# Patient Record
Sex: FEMALE | Race: WHITE | NOT HISPANIC OR LATINO | URBAN - METROPOLITAN AREA
[De-identification: names, ages, dates, MRNs, and addresses within clinical notes are randomized per-mention and may not be internally consistent; named-entity substitution may affect disease eponyms.]

---

## 2023-09-11 ENCOUNTER — EMERGENCY (EMERGENCY)
Facility: HOSPITAL | Age: 58
LOS: 1 days | Discharge: HOME | End: 2023-09-11
Attending: STUDENT IN AN ORGANIZED HEALTH CARE EDUCATION/TRAINING PROGRAM | Admitting: STUDENT IN AN ORGANIZED HEALTH CARE EDUCATION/TRAINING PROGRAM
Payer: COMMERCIAL

## 2023-09-11 VITALS
HEIGHT: 65 IN | HEART RATE: 65 BPM | WEIGHT: 149.91 LBS | DIASTOLIC BLOOD PRESSURE: 76 MMHG | RESPIRATION RATE: 16 BRPM | SYSTOLIC BLOOD PRESSURE: 114 MMHG | TEMPERATURE: 98 F | OXYGEN SATURATION: 97 %

## 2023-09-11 VITALS
DIASTOLIC BLOOD PRESSURE: 79 MMHG | OXYGEN SATURATION: 97 % | HEART RATE: 62 BPM | TEMPERATURE: 98 F | SYSTOLIC BLOOD PRESSURE: 121 MMHG | RESPIRATION RATE: 18 BRPM

## 2023-09-11 DIAGNOSIS — I44.4 LEFT ANTERIOR FASCICULAR BLOCK: ICD-10-CM

## 2023-09-11 DIAGNOSIS — R07.81 PLEURODYNIA: ICD-10-CM

## 2023-09-11 DIAGNOSIS — R20.2 PARESTHESIA OF SKIN: ICD-10-CM

## 2023-09-11 DIAGNOSIS — R06.02 SHORTNESS OF BREATH: ICD-10-CM

## 2023-09-11 DIAGNOSIS — M79.602 PAIN IN LEFT ARM: ICD-10-CM

## 2023-09-11 LAB
ALBUMIN SERPL ELPH-MCNC: 4.7 G/DL — SIGNIFICANT CHANGE UP (ref 3.3–5)
ALP SERPL-CCNC: 48 U/L — SIGNIFICANT CHANGE UP (ref 40–120)
ALT FLD-CCNC: 18 U/L — SIGNIFICANT CHANGE UP (ref 10–45)
ANION GAP SERPL CALC-SCNC: 13 MMOL/L — SIGNIFICANT CHANGE UP (ref 5–17)
APTT BLD: 33.6 SEC — SIGNIFICANT CHANGE UP (ref 24.5–35.6)
AST SERPL-CCNC: 17 U/L — SIGNIFICANT CHANGE UP (ref 10–40)
BILIRUB SERPL-MCNC: 0.8 MG/DL — SIGNIFICANT CHANGE UP (ref 0.2–1.2)
BUN SERPL-MCNC: 17 MG/DL — SIGNIFICANT CHANGE UP (ref 7–23)
CALCIUM SERPL-MCNC: 10.4 MG/DL — SIGNIFICANT CHANGE UP (ref 8.4–10.5)
CHLORIDE SERPL-SCNC: 104 MMOL/L — SIGNIFICANT CHANGE UP (ref 96–108)
CO2 SERPL-SCNC: 24 MMOL/L — SIGNIFICANT CHANGE UP (ref 22–31)
CREAT SERPL-MCNC: 0.63 MG/DL — SIGNIFICANT CHANGE UP (ref 0.5–1.3)
EGFR: 104 ML/MIN/1.73M2 — SIGNIFICANT CHANGE UP
GLUCOSE SERPL-MCNC: 109 MG/DL — HIGH (ref 70–99)
HCT VFR BLD CALC: 44.9 % — SIGNIFICANT CHANGE UP (ref 34.5–45)
HGB BLD-MCNC: 15.3 G/DL — SIGNIFICANT CHANGE UP (ref 11.5–15.5)
INR BLD: 0.99 — SIGNIFICANT CHANGE UP (ref 0.85–1.18)
MAGNESIUM SERPL-MCNC: 2.2 MG/DL — SIGNIFICANT CHANGE UP (ref 1.6–2.6)
MCHC RBC-ENTMCNC: 28.2 PG — SIGNIFICANT CHANGE UP (ref 27–34)
MCHC RBC-ENTMCNC: 34.1 GM/DL — SIGNIFICANT CHANGE UP (ref 32–36)
MCV RBC AUTO: 82.7 FL — SIGNIFICANT CHANGE UP (ref 80–100)
NRBC # BLD: 0 /100 WBCS — SIGNIFICANT CHANGE UP (ref 0–0)
PHOSPHATE SERPL-MCNC: 3.8 MG/DL — SIGNIFICANT CHANGE UP (ref 2.5–4.5)
PLATELET # BLD AUTO: 243 K/UL — SIGNIFICANT CHANGE UP (ref 150–400)
POTASSIUM SERPL-MCNC: 4.1 MMOL/L — SIGNIFICANT CHANGE UP (ref 3.5–5.3)
POTASSIUM SERPL-SCNC: 4.1 MMOL/L — SIGNIFICANT CHANGE UP (ref 3.5–5.3)
PROT SERPL-MCNC: 7.7 G/DL — SIGNIFICANT CHANGE UP (ref 6–8.3)
PROTHROM AB SERPL-ACNC: 11.3 SEC — SIGNIFICANT CHANGE UP (ref 9.5–13)
RBC # BLD: 5.43 M/UL — HIGH (ref 3.8–5.2)
RBC # FLD: 13 % — SIGNIFICANT CHANGE UP (ref 10.3–14.5)
SODIUM SERPL-SCNC: 141 MMOL/L — SIGNIFICANT CHANGE UP (ref 135–145)
TROPONIN T, HIGH SENSITIVITY RESULT: <6 NG/L — SIGNIFICANT CHANGE UP (ref 0–51)
WBC # BLD: 7.55 K/UL — SIGNIFICANT CHANGE UP (ref 3.8–10.5)
WBC # FLD AUTO: 7.55 K/UL — SIGNIFICANT CHANGE UP (ref 3.8–10.5)

## 2023-09-11 PROCEDURE — 85027 COMPLETE CBC AUTOMATED: CPT

## 2023-09-11 PROCEDURE — 85379 FIBRIN DEGRADATION QUANT: CPT

## 2023-09-11 PROCEDURE — 85610 PROTHROMBIN TIME: CPT

## 2023-09-11 PROCEDURE — 99285 EMERGENCY DEPT VISIT HI MDM: CPT

## 2023-09-11 PROCEDURE — 85730 THROMBOPLASTIN TIME PARTIAL: CPT

## 2023-09-11 PROCEDURE — 84100 ASSAY OF PHOSPHORUS: CPT

## 2023-09-11 PROCEDURE — 71045 X-RAY EXAM CHEST 1 VIEW: CPT

## 2023-09-11 PROCEDURE — 83735 ASSAY OF MAGNESIUM: CPT

## 2023-09-11 PROCEDURE — 84484 ASSAY OF TROPONIN QUANT: CPT

## 2023-09-11 PROCEDURE — 99285 EMERGENCY DEPT VISIT HI MDM: CPT | Mod: 25

## 2023-09-11 PROCEDURE — 80053 COMPREHEN METABOLIC PANEL: CPT

## 2023-09-11 PROCEDURE — 71045 X-RAY EXAM CHEST 1 VIEW: CPT | Mod: 26

## 2023-09-11 PROCEDURE — 36415 COLL VENOUS BLD VENIPUNCTURE: CPT

## 2023-09-11 NOTE — ED PROVIDER NOTE - PHYSICAL EXAMINATION
general: Well appearing, in no acute distress  HEENT: Normocephalic, atraumatic, extraocular movements intact, no bruits  CV: Regular rate  Pulm: No respiratory distress, no tachypnea, CTAB, no w/r/r  Abd: Flat, no gross distension, soft, ntnd  Ext: warm and well perfused, symmetric 2+ radial pulses bilaterally, no le edema  Skin: No gross rashes or lesions  Neuro: Alert and oriented, moving all extremities, 5/5 motor bilatreally, sensation intact bilaterally, CN II-XII intact, no dysmetria, normal tandem gait

## 2023-09-11 NOTE — ED PROVIDER NOTE - OBJECTIVE STATEMENT
57 yo f with no sig pmh p/w l sided pleuritic cp since this AM. Pt woke up this AM with pleuritic left sided cp. States she felt subjective numbness of L upper arm during this time. No neck pain or neck stiffness, no trauma. Some associated sob. No abd pain, no recent travel, no history of dvt or pe, no leg pain or leg swelling. No headache or blurry vision. ROS as above.

## 2023-09-11 NOTE — ED ADULT NURSE NOTE - OBJECTIVE STATEMENT
Pt. a&ox4 ambulatory with steady gait, no known cardiac hx, last cards workup many yrs ago, hx of HTN on losartan and Amlodipine, comes to ED c/o left sided squeezing constant nonexertional cp, with nontender to palpation non-positional left rib pain, that can be felt in the upper back / L scapulae, since this am. Reporting + secondary SOB and worse pain on deep inspiration. Denies palpitations, n/v/d, f/c, dizziness, HA, vision changes. EKG done, PIV placed.

## 2023-09-11 NOTE — ED PROVIDER NOTE - NSFOLLOWUPINSTRUCTIONS_ED_ALL_ED_FT
Please take tylenol or motrin as needed. Return for worsening symptoms, pain, lightheadedness, dizziness, numbness, weakness, tingling. Please follow up closely with your primary physician.    I hope you feel better soon!    Sincerely,  Chanel Garcia MD

## 2023-09-11 NOTE — ED PROVIDER NOTE - CLINICAL SUMMARY MEDICAL DECISION MAKING FREE TEXT BOX
57 yo f with cp and subjective numbness of upper arm, neuro intact, well appearing, no objective decreased sensation. Will ro acs with trop, low risk pe, given pleuritic nature, will obtain d-dimer, also low risk dissection, will obtain cxr and d-dimer for low risk dissection, reassuring exam and symmetric pulses. will reassess.

## 2023-09-11 NOTE — ED ADULT NURSE NOTE - NSFALLUNIVINTERV_ED_ALL_ED
Bed/Stretcher in lowest position, wheels locked, appropriate side rails in place/Call bell, personal items and telephone in reach/Instruct patient to call for assistance before getting out of bed/chair/stretcher/Non-slip footwear applied when patient is off stretcher/[place] to call system/Physically safe environment - no spills, clutter or unnecessary equipment/Purposeful proactive rounding/Room/bathroom lighting operational, light cord in reach

## 2023-09-11 NOTE — ED ADULT TRIAGE NOTE - CHIEF COMPLAINT QUOTE
Pt BIB EMS for left sided CP/rib pain worse on inspiration and left arm numbness since this morning.

## 2024-05-26 PROBLEM — Z00.00 ENCOUNTER FOR PREVENTIVE HEALTH EXAMINATION: Status: ACTIVE | Noted: 2024-05-26

## 2024-05-28 ENCOUNTER — APPOINTMENT (OUTPATIENT)
Age: 59
End: 2024-05-28
Payer: COMMERCIAL

## 2024-05-28 VITALS
HEART RATE: 68 BPM | SYSTOLIC BLOOD PRESSURE: 132 MMHG | WEIGHT: 154.32 LBS | DIASTOLIC BLOOD PRESSURE: 85 MMHG | HEIGHT: 62 IN | OXYGEN SATURATION: 96 % | BODY MASS INDEX: 28.4 KG/M2

## 2024-05-28 DIAGNOSIS — G96.191 PERINEURAL CYST: ICD-10-CM

## 2024-05-28 DIAGNOSIS — M54.10 RADICULOPATHY, SITE UNSPECIFIED: ICD-10-CM

## 2024-05-28 PROCEDURE — 99204 OFFICE O/P NEW MOD 45 MIN: CPT

## 2024-05-28 NOTE — DISCUSSION/SUMMARY
[de-identified] : I examined, evaluated, and discussed with the patient. The patient has low back and right leg symptoms for 1 year, only when lying down. Pain is severe and she cannot sleep and have to take Ibuprofen every day.  Based on physical exam and image findings, the patient diagnosis is Tarlov cyst causing radiculopathy.  Treatment plan is new lumbar MRI including axial cut at cyst area and TFESI by Dr. Cornelius.   The patient understands everything and all questions were answered. The patient will return after TFESI.

## 2024-05-28 NOTE — PHYSICAL EXAM
[de-identified] : The patient is alert, cooperative, and oriented x 3. Pupils are equal and round. The patient does not have skin rash.   Gait is normal. Back ROM is within normal range. No clear tenderness at PVM. SLR negative. DTR normal range. Motor and sensory are basically normal throughout bilateral L/E. Circulation of legs is normal. Bladder and bowel functions are normal. [de-identified] : Lumbar MRI taken recently at [facility] show Tarlov cyst at S2 area both sides (Right side greater than left side). But no axial cut for that area.

## 2024-05-28 NOTE — CONSULT LETTER
[Dear  ___] : Dear  [unfilled], [Consult Letter:] : I had the pleasure of evaluating your patient, [unfilled]. [Please see my note below.] : Please see my note below. [Consult Closing:] : Thank you very much for allowing me to participate in the care of this patient.  If you have any questions, please do not hesitate to contact me. [Sincerely,] : Sincerely, [[email or web address]] : Usha Barton MD PhD

## 2024-05-28 NOTE — HISTORY OF PRESENT ILLNESS
[de-identified] : The patient is 59 years old female who has severe low back pain for 1 year. She also has right leg pain which is at posterior leg. The symptom is only when she is lying down and she does not have the symptom when sitting up or walking. No clear cause of the symptoms. The patient is referred by Dr. Cornelius.   Pain Level:  10 Duration of Symptoms:  1 year Symptom course:  Getting worse Accident:  No   Previous Treatment:    Pain meds:  Yes, NSAIDs  Ibuprofen    Physical therapy:  Yes      Epidural steroid injection:  No

## 2024-06-21 ENCOUNTER — APPOINTMENT (OUTPATIENT)
Dept: MRI IMAGING | Facility: CLINIC | Age: 59
End: 2024-06-21
Payer: COMMERCIAL

## 2024-06-21 PROCEDURE — 72148 MRI LUMBAR SPINE W/O DYE: CPT
